# Patient Record
Sex: MALE | Race: WHITE | NOT HISPANIC OR LATINO | URBAN - METROPOLITAN AREA
[De-identification: names, ages, dates, MRNs, and addresses within clinical notes are randomized per-mention and may not be internally consistent; named-entity substitution may affect disease eponyms.]

---

## 2022-05-09 ENCOUNTER — AMBULATORY SURGICAL CENTER (OUTPATIENT)
Dept: URBAN - METROPOLITAN AREA SURGERY 3 | Facility: SURGERY | Age: 52
End: 2022-05-09
Payer: COMMERCIAL

## 2022-05-09 VITALS
RESPIRATION RATE: 16 BRPM | OXYGEN SATURATION: 97 % | RESPIRATION RATE: 23 BRPM | DIASTOLIC BLOOD PRESSURE: 100 MMHG | OXYGEN SATURATION: 96 % | HEART RATE: 118 BPM | DIASTOLIC BLOOD PRESSURE: 93 MMHG | OXYGEN SATURATION: 97 % | DIASTOLIC BLOOD PRESSURE: 95 MMHG | DIASTOLIC BLOOD PRESSURE: 72 MMHG | RESPIRATION RATE: 23 BRPM | TEMPERATURE: 98.4 F | HEART RATE: 118 BPM | HEART RATE: 89 BPM | DIASTOLIC BLOOD PRESSURE: 93 MMHG | WEIGHT: 225 LBS | RESPIRATION RATE: 16 BRPM | WEIGHT: 225 LBS | HEART RATE: 83 BPM | RESPIRATION RATE: 22 BRPM | SYSTOLIC BLOOD PRESSURE: 136 MMHG | SYSTOLIC BLOOD PRESSURE: 150 MMHG | DIASTOLIC BLOOD PRESSURE: 108 MMHG | TEMPERATURE: 97.2 F | SYSTOLIC BLOOD PRESSURE: 150 MMHG | SYSTOLIC BLOOD PRESSURE: 144 MMHG | SYSTOLIC BLOOD PRESSURE: 134 MMHG | SYSTOLIC BLOOD PRESSURE: 141 MMHG | SYSTOLIC BLOOD PRESSURE: 142 MMHG | DIASTOLIC BLOOD PRESSURE: 95 MMHG | HEART RATE: 89 BPM | TEMPERATURE: 98.4 F | OXYGEN SATURATION: 96 % | RESPIRATION RATE: 20 BRPM | HEART RATE: 83 BPM | TEMPERATURE: 97.2 F | RESPIRATION RATE: 22 BRPM | DIASTOLIC BLOOD PRESSURE: 108 MMHG | TEMPERATURE: 98.4 F | DIASTOLIC BLOOD PRESSURE: 86 MMHG | DIASTOLIC BLOOD PRESSURE: 100 MMHG | RESPIRATION RATE: 20 BRPM | SYSTOLIC BLOOD PRESSURE: 144 MMHG | WEIGHT: 225 LBS | DIASTOLIC BLOOD PRESSURE: 100 MMHG | HEART RATE: 90 BPM | HEART RATE: 91 BPM | HEART RATE: 118 BPM | SYSTOLIC BLOOD PRESSURE: 134 MMHG | HEART RATE: 90 BPM | DIASTOLIC BLOOD PRESSURE: 108 MMHG | SYSTOLIC BLOOD PRESSURE: 136 MMHG | RESPIRATION RATE: 20 BRPM | RESPIRATION RATE: 16 BRPM | RESPIRATION RATE: 23 BRPM | HEART RATE: 90 BPM | OXYGEN SATURATION: 96 % | HEIGHT: 70 IN | DIASTOLIC BLOOD PRESSURE: 93 MMHG | HEIGHT: 70 IN | HEIGHT: 70 IN | RESPIRATION RATE: 22 BRPM | HEART RATE: 91 BPM | SYSTOLIC BLOOD PRESSURE: 142 MMHG | DIASTOLIC BLOOD PRESSURE: 86 MMHG | DIASTOLIC BLOOD PRESSURE: 95 MMHG | DIASTOLIC BLOOD PRESSURE: 72 MMHG | HEART RATE: 83 BPM | OXYGEN SATURATION: 97 % | HEART RATE: 89 BPM | SYSTOLIC BLOOD PRESSURE: 134 MMHG | TEMPERATURE: 97.2 F | SYSTOLIC BLOOD PRESSURE: 150 MMHG | SYSTOLIC BLOOD PRESSURE: 136 MMHG | SYSTOLIC BLOOD PRESSURE: 144 MMHG | DIASTOLIC BLOOD PRESSURE: 86 MMHG | DIASTOLIC BLOOD PRESSURE: 72 MMHG | SYSTOLIC BLOOD PRESSURE: 141 MMHG | HEART RATE: 91 BPM | SYSTOLIC BLOOD PRESSURE: 142 MMHG | SYSTOLIC BLOOD PRESSURE: 141 MMHG

## 2022-05-09 DIAGNOSIS — Z12.11 ENCOUNTER FOR SCREENING FOR MALIGNANT NEOPLASM OF COLON: ICD-10-CM

## 2022-05-09 DIAGNOSIS — K57.30 DIVERTICULOSIS OF LARGE INTESTINE WITHOUT PERFORATION OR ABS: ICD-10-CM

## 2022-05-09 PROCEDURE — G0121 COLON CA SCRN NOT HI RSK IND: HCPCS | Performed by: INTERNAL MEDICINE

## 2023-04-07 ENCOUNTER — OFFICE (OUTPATIENT)
Dept: URBAN - METROPOLITAN AREA CLINIC 11 | Facility: CLINIC | Age: 53
End: 2023-04-07

## 2023-04-07 VITALS
SYSTOLIC BLOOD PRESSURE: 146 MMHG | HEIGHT: 70 IN | OXYGEN SATURATION: 96 % | WEIGHT: 235 LBS | DIASTOLIC BLOOD PRESSURE: 102 MMHG | HEART RATE: 118 BPM

## 2023-04-07 DIAGNOSIS — K57.30 DIVERTICULOSIS OF LARGE INTESTINE WITHOUT PERFORATION OR ABS: ICD-10-CM

## 2023-04-07 DIAGNOSIS — R10.30 LOWER ABDOMINAL PAIN, UNSPECIFIED: ICD-10-CM

## 2023-04-07 PROCEDURE — 99214 OFFICE O/P EST MOD 30 MIN: CPT | Performed by: NURSE PRACTITIONER

## 2023-04-07 RX ORDER — METRONIDAZOLE 250 MG/1
750 TABLET, FILM COATED ORAL
Qty: 30 | Refills: 0 | Status: ACTIVE
Start: 2023-04-07

## 2023-04-07 RX ORDER — CIPROFLOXACIN HYDROCHLORIDE 500 MG/1
1000 TABLET, FILM COATED ORAL
Qty: 20 | Refills: 0 | Status: ACTIVE
Start: 2023-04-07

## 2023-04-07 NOTE — SERVICEHPINOTES
Mr. Watters presents today for abdominal pain that presented a week ago. Pain began in the RLQ radiated across the abdomen to his left abdomen tp the groin and into his penis. He denies futher groin or penile pain. He notes that it almost feels like constipation and has a gas bubble. It has now subsided. He took a gentle laxative that night. The following day he developed diarrhea that lasted all day. He developed a fever of 101 that would fluctuate for several days. Throughout the week if he passed gas he would have a significant abdominal cramping. He had some mild nausea initially. He notes that he is passing a normal bowel movement daily with no discomfort when he has bowel movement. He mainly feels discomfort with positional changes. br
br This has occurs three times over the past 10 years. The last occurrence was in 2019. He was given a Medrol Dose Maco and Z-maco.

## 2023-04-07 NOTE — SERVICENOTES
We reviewed his pain and potential differentials including diverticulitis, urinary, musculoskeletal etc. Will plan for labs and CT. With presence of diverticulosis I would like to go ahead and start him on antibiotics at this time.

## 2023-04-08 LAB
CBC, PLATELET, NO DIFFERENTIAL: HEMATOCRIT: 47.9 % (ref 37.5–51)
CBC, PLATELET, NO DIFFERENTIAL: HEMOGLOBIN: 16.4 G/DL (ref 13–17.7)
CBC, PLATELET, NO DIFFERENTIAL: MCH: 34.7 PG — HIGH (ref 26.6–33)
CBC, PLATELET, NO DIFFERENTIAL: MCHC: 34.2 G/DL (ref 31.5–35.7)
CBC, PLATELET, NO DIFFERENTIAL: MCV: 102 FL — HIGH (ref 79–97)
CBC, PLATELET, NO DIFFERENTIAL: PLATELETS: 327 X10E3/UL (ref 150–450)
CBC, PLATELET, NO DIFFERENTIAL: RBC: 4.72 X10E6/UL (ref 4.14–5.8)
CBC, PLATELET, NO DIFFERENTIAL: RDW: 13.7 % (ref 11.6–15.4)
CBC, PLATELET, NO DIFFERENTIAL: WBC: 7.8 X10E3/UL (ref 3.4–10.8)
COMP. METABOLIC PANEL (14): A/G RATIO: 1.2 (ref 1.2–2.2)
COMP. METABOLIC PANEL (14): ALBUMIN: 3.7 G/DL — LOW (ref 3.8–4.9)
COMP. METABOLIC PANEL (14): ALKALINE PHOSPHATASE: 111 IU/L (ref 44–121)
COMP. METABOLIC PANEL (14): ALT (SGPT): 22 IU/L (ref 0–44)
COMP. METABOLIC PANEL (14): AST (SGOT): 16 IU/L (ref 0–40)
COMP. METABOLIC PANEL (14): BILIRUBIN, TOTAL: 0.3 MG/DL (ref 0–1.2)
COMP. METABOLIC PANEL (14): BUN/CREATININE RATIO: 19 (ref 9–20)
COMP. METABOLIC PANEL (14): BUN: 17 MG/DL (ref 6–24)
COMP. METABOLIC PANEL (14): CALCIUM: 9.5 MG/DL (ref 8.7–10.2)
COMP. METABOLIC PANEL (14): CARBON DIOXIDE, TOTAL: 27 MMOL/L (ref 20–29)
COMP. METABOLIC PANEL (14): CHLORIDE: 102 MMOL/L (ref 96–106)
COMP. METABOLIC PANEL (14): CREATININE: 0.88 MG/DL (ref 0.76–1.27)
COMP. METABOLIC PANEL (14): EGFR: 104 ML/MIN/1.73 (ref 59–?)
COMP. METABOLIC PANEL (14): GLOBULIN, TOTAL: 3 G/DL (ref 1.5–4.5)
COMP. METABOLIC PANEL (14): GLUCOSE: 74 MG/DL (ref 70–99)
COMP. METABOLIC PANEL (14): POTASSIUM: 4.5 MMOL/L (ref 3.5–5.2)
COMP. METABOLIC PANEL (14): PROTEIN, TOTAL: 6.7 G/DL (ref 6–8.5)
COMP. METABOLIC PANEL (14): SODIUM: 145 MMOL/L — HIGH (ref 134–144)
MICROSCOPIC EXAMINATION: BACTERIA: (no result)
MICROSCOPIC EXAMINATION: CAST TYPE: (no result)
MICROSCOPIC EXAMINATION: CASTS: (no result) /LPF
MICROSCOPIC EXAMINATION: COMMENT: (no result)
MICROSCOPIC EXAMINATION: CRYSTAL TYPE: (no result)
MICROSCOPIC EXAMINATION: CRYSTALS: PRESENT
MICROSCOPIC EXAMINATION: EPITHELIAL CELLS (NON RENAL): (no result) /HPF
MICROSCOPIC EXAMINATION: EPITHELIAL CELLS (RENAL): (no result)
MICROSCOPIC EXAMINATION: MUCUS THREADS: (no result)
MICROSCOPIC EXAMINATION: RBC: (no result) /HPF
MICROSCOPIC EXAMINATION: TRICHOMONAS: (no result)
MICROSCOPIC EXAMINATION: WBC: (no result) /HPF
MICROSCOPIC EXAMINATION: YEAST: (no result)
URINALYSIS, COMPLETE: APPEARANCE: CLEAR
URINALYSIS, COMPLETE: BILIRUBIN: NEGATIVE
URINALYSIS, COMPLETE: GLUCOSE: NEGATIVE
URINALYSIS, COMPLETE: KETONES: NEGATIVE
URINALYSIS, COMPLETE: MICROSCOPIC EXAMINATION: (no result)
URINALYSIS, COMPLETE: MICROSCOPIC EXAMINATION: (no result)
URINALYSIS, COMPLETE: NITRITE, URINE: NEGATIVE
URINALYSIS, COMPLETE: OCCULT BLOOD: NEGATIVE
URINALYSIS, COMPLETE: PH: 5.5 (ref 5–7.5)
URINALYSIS, COMPLETE: PROTEIN: (no result)
URINALYSIS, COMPLETE: SPECIFIC GRAVITY: 1.02 (ref 1–1.03)
URINALYSIS, COMPLETE: URINE-COLOR: YELLOW
URINALYSIS, COMPLETE: UROBILINOGEN,SEMI-QN: 0.2 MG/DL (ref 0.2–1)
URINALYSIS, COMPLETE: WBC ESTERASE: NEGATIVE